# Patient Record
Sex: FEMALE | Race: WHITE | NOT HISPANIC OR LATINO | URBAN - METROPOLITAN AREA
[De-identification: names, ages, dates, MRNs, and addresses within clinical notes are randomized per-mention and may not be internally consistent; named-entity substitution may affect disease eponyms.]

---

## 2018-10-01 ENCOUNTER — NEW PATIENT (OUTPATIENT)
Dept: URBAN - METROPOLITAN AREA CLINIC 51 | Facility: CLINIC | Age: 73
End: 2018-10-01
Payer: MEDICARE

## 2018-10-01 DIAGNOSIS — H25.13 AGE-RELATED NUCLEAR CATARACT, BILATERAL: ICD-10-CM

## 2018-10-01 DIAGNOSIS — H02.834 DERMATOCHALASIS OF LEFT UPPER LID: ICD-10-CM

## 2018-10-01 PROCEDURE — 92004 COMPRE OPH EXAM NEW PT 1/>: CPT | Performed by: OPTOMETRIST

## 2018-10-01 RX ORDER — LIFITEGRAST 50 MG/ML
5 % SOLUTION/ DROPS OPHTHALMIC
Qty: 60 | Refills: 3 | Status: INACTIVE
Start: 2018-10-01 | End: 2018-10-15

## 2018-10-01 RX ORDER — CYCLOSPORINE 0.5 MG/ML
0.05 % EMULSION OPHTHALMIC
Qty: 3 | Refills: 3 | Status: INACTIVE
Start: 2018-10-01 | End: 2018-10-15

## 2018-10-01 ASSESSMENT — VISUAL ACUITY
OD: 20/20
OS: 20/20

## 2018-10-01 ASSESSMENT — INTRAOCULAR PRESSURE
OS: 13
OD: 12

## 2018-10-01 ASSESSMENT — KERATOMETRY
OS: 42.72
OD: 42.51

## 2020-03-03 ENCOUNTER — FOLLOW UP ESTABLISHED (OUTPATIENT)
Dept: URBAN - METROPOLITAN AREA CLINIC 51 | Facility: CLINIC | Age: 75
End: 2020-03-03
Payer: MEDICARE

## 2020-03-03 DIAGNOSIS — H02.831 DERMATOCHALASIS OF RIGHT UPPER EYELID: ICD-10-CM

## 2020-03-03 DIAGNOSIS — H18.59 OTHER HEREDITARY CORNEAL DYSTROPHIES: Primary | ICD-10-CM

## 2020-03-03 PROCEDURE — 92134 CPTRZ OPH DX IMG PST SGM RTA: CPT | Performed by: OPTOMETRIST

## 2020-03-03 PROCEDURE — 92014 COMPRE OPH EXAM EST PT 1/>: CPT | Performed by: OPTOMETRIST

## 2020-03-03 ASSESSMENT — INTRAOCULAR PRESSURE
OD: 15
OS: 15

## 2020-03-03 ASSESSMENT — KERATOMETRY
OS: 42.59
OD: 42.64

## 2020-03-03 ASSESSMENT — VISUAL ACUITY
OS: 20/20
OD: 20/30

## 2021-03-09 ENCOUNTER — FOLLOW UP ESTABLISHED (OUTPATIENT)
Dept: URBAN - METROPOLITAN AREA CLINIC 51 | Facility: CLINIC | Age: 76
End: 2021-03-09
Payer: MEDICARE

## 2021-03-09 DIAGNOSIS — H43.813 VITREOUS DEGENERATION, BILATERAL: ICD-10-CM

## 2021-03-09 DIAGNOSIS — H18.593 OTHER HEREDITARY CORNEAL DYSTROPHIES: ICD-10-CM

## 2021-03-09 DIAGNOSIS — H04.123 DRY EYE SYNDROME OF BILATERAL LACRIMAL GLANDS: ICD-10-CM

## 2021-03-09 PROCEDURE — 92134 CPTRZ OPH DX IMG PST SGM RTA: CPT | Performed by: OPTOMETRIST

## 2021-03-09 PROCEDURE — 92014 COMPRE OPH EXAM EST PT 1/>: CPT | Performed by: OPTOMETRIST

## 2021-03-09 RX ORDER — CYCLOSPORINE 0.5 MG/ML
0.05 % EMULSION OPHTHALMIC
Qty: 120 | Refills: 6 | Status: INACTIVE
Start: 2021-03-09 | End: 2021-04-07

## 2021-03-09 ASSESSMENT — KERATOMETRY
OS: 42.73
OD: 42.50

## 2021-03-09 ASSESSMENT — VISUAL ACUITY
OS: 20/20
OD: 20/30

## 2021-03-09 ASSESSMENT — INTRAOCULAR PRESSURE
OS: 14
OD: 15

## 2022-03-21 ENCOUNTER — OFFICE VISIT (OUTPATIENT)
Dept: URBAN - METROPOLITAN AREA CLINIC 51 | Facility: CLINIC | Age: 77
End: 2022-03-21
Payer: MEDICARE

## 2022-03-21 DIAGNOSIS — H35.371 PUCKERING OF MACULA, RIGHT EYE: ICD-10-CM

## 2022-03-21 DIAGNOSIS — H52.4 PRESBYOPIA: ICD-10-CM

## 2022-03-21 PROCEDURE — 92134 CPTRZ OPH DX IMG PST SGM RTA: CPT | Performed by: OPTOMETRIST

## 2022-03-21 PROCEDURE — 92014 COMPRE OPH EXAM EST PT 1/>: CPT | Performed by: OPTOMETRIST

## 2022-03-21 RX ORDER — CYCLOSPORINE 0.5 MG/ML
0.05 % EMULSION OPHTHALMIC
Qty: 120 | Refills: 6 | Status: INACTIVE
Start: 2022-03-21 | End: 2022-04-19

## 2022-03-21 ASSESSMENT — KERATOMETRY
OS: 42.70
OD: 42.30

## 2022-03-21 ASSESSMENT — INTRAOCULAR PRESSURE
OD: 14
OS: 14

## 2022-03-21 ASSESSMENT — VISUAL ACUITY
OD: 20/20
OS: 20/25

## 2022-03-21 NOTE — IMPRESSION/PLAN
Impression: Other hereditary corneal dystrophies ; OU Plan: Patient to continue ATs QID or more. She is required to do DAILY to minimize risks for fluctuating vision and discomfort or increase risk for corneal abrasions/erosions. Patient to continue Restasis BID OU.  Refilled to [facility]

## 2022-03-21 NOTE — IMPRESSION/PLAN
Impression: Tear film insufficiency of bilateral lacrimal glands *Does use ceiling fans. 
*make up debris OU Plan: see EBMD plan

## 2022-03-21 NOTE — IMPRESSION/PLAN
Impression: Puckering of macula, right eye *Trace Plan: OCT Macular Scan completed, reviewed and documented. Stable, no changes. Check annually or sooner if patient notices distortions or if there is a decline in vision.

## 2023-05-16 ENCOUNTER — OFFICE VISIT (OUTPATIENT)
Dept: URBAN - METROPOLITAN AREA CLINIC 51 | Facility: CLINIC | Age: 78
End: 2023-05-16
Payer: MEDICARE

## 2023-05-16 DIAGNOSIS — H43.813 VITREOUS DEGENERATION, BILATERAL: ICD-10-CM

## 2023-05-16 DIAGNOSIS — H52.4 PRESBYOPIA: ICD-10-CM

## 2023-05-16 DIAGNOSIS — H04.123 TEAR FILM INSUFFICIENCY OF BILATERAL LACRIMAL GLANDS: ICD-10-CM

## 2023-05-16 DIAGNOSIS — H35.3131 NONEXUDATIVE MACULAR DEGENERATION, EARLY DRY STAGE, BILATERAL: ICD-10-CM

## 2023-05-16 DIAGNOSIS — H02.834 DERMATOCHALASIS OF LEFT UPPER EYELID: ICD-10-CM

## 2023-05-16 DIAGNOSIS — H25.13 AGE-RELATED NUCLEAR CATARACT, BILATERAL: Primary | ICD-10-CM

## 2023-05-16 DIAGNOSIS — H02.831 DERMATOCHALASIS OF RIGHT UPPER EYELID: ICD-10-CM

## 2023-05-16 DIAGNOSIS — H18.593 OTHER HEREDITARY CORNEAL DYSTROPHIES: ICD-10-CM

## 2023-05-16 PROCEDURE — 92134 CPTRZ OPH DX IMG PST SGM RTA: CPT | Performed by: OPTOMETRIST

## 2023-05-16 PROCEDURE — 99214 OFFICE O/P EST MOD 30 MIN: CPT | Performed by: OPTOMETRIST

## 2023-05-16 ASSESSMENT — VISUAL ACUITY
OD: 20/30
OS: 20/20

## 2023-05-16 ASSESSMENT — INTRAOCULAR PRESSURE
OS: 15
OD: 13

## 2023-05-16 NOTE — IMPRESSION/PLAN
Impression: Nonexudative macular degeneration, early dry stage, bilateral: H35.3133. Plan: [place]en OU, no SRF OU. Discussed condition with patient. Discussed harmful effects of smoking. Counseling about the benefits and/or risks of the Age-Related Eye Disease Study 2(AREDS) formulation for preventing progression of age-related macular degeneration (AMD) was provided. Recommend UV blocking sunglasses when outdoors and eating green leafy vegetables. OCT performed today and reviewed. OCT check annually. Disp PRN brochure RTC if significant decline with vision or distortions that is new

## 2023-05-16 NOTE — IMPRESSION/PLAN
Impression: Age-related nuclear cataract, bilateral: H25.13. Plan:  Discussed today's findings with patient. The cataracts reduce visual function to a level that interferes with the patient's everyday activities and are progressing. Based on clinical findings. I have discussed with the patient, the option for cataract surgery at this time and possible benefit patient would achieve. At this time patient elects to wait due to 's ongoing medical issues with staph issues. Continue to monitor. Patient will call if any changes/worsening in vision Recheck in 6 months

## 2023-05-16 NOTE — IMPRESSION/PLAN
Impression: Other hereditary corneal dystrophies: H18.593 OU. Plan: Educated patient to continue using ATs QID or more OU. Recommended patient to use ATs daily to minimize fluctuating vision. Patient would like it covered by the [address] through her .

## 2024-05-06 ENCOUNTER — OFFICE VISIT (OUTPATIENT)
Dept: URBAN - METROPOLITAN AREA CLINIC 51 | Facility: CLINIC | Age: 79
End: 2024-05-06
Payer: MEDICARE

## 2024-05-06 DIAGNOSIS — H25.13 AGE-RELATED NUCLEAR CATARACT, BILATERAL: Primary | ICD-10-CM

## 2024-05-06 DIAGNOSIS — H43.813 VITREOUS DEGENERATION, BILATERAL: ICD-10-CM

## 2024-05-06 DIAGNOSIS — H02.834 DERMATOCHALASIS OF LEFT UPPER EYELID: ICD-10-CM

## 2024-05-06 DIAGNOSIS — H18.593 OTHER HEREDITARY CORNEAL DYSTROPHIES: ICD-10-CM

## 2024-05-06 DIAGNOSIS — H52.4 PRESBYOPIA: ICD-10-CM

## 2024-05-06 DIAGNOSIS — H35.3131 NONEXUDATIVE AGE-RELATED MACULAR DEGENERATION, BILATERAL, EARLY DRY STAGE: ICD-10-CM

## 2024-05-06 DIAGNOSIS — H02.831 DERMATOCHALASIS OF RIGHT UPPER EYELID: ICD-10-CM

## 2024-05-06 PROCEDURE — 92134 CPTRZ OPH DX IMG PST SGM RTA: CPT | Performed by: OPTOMETRIST

## 2024-05-06 PROCEDURE — 99214 OFFICE O/P EST MOD 30 MIN: CPT | Performed by: OPTOMETRIST

## 2024-05-06 ASSESSMENT — INTRAOCULAR PRESSURE
OS: 13
OD: 14

## 2024-05-06 ASSESSMENT — KERATOMETRY
OS: 43.00
OD: 43.00

## 2024-05-06 ASSESSMENT — VISUAL ACUITY
OS: 20/25
OD: 20/30

## 2024-11-11 ENCOUNTER — OFFICE VISIT (OUTPATIENT)
Dept: URBAN - METROPOLITAN AREA CLINIC 51 | Facility: CLINIC | Age: 79
End: 2024-11-11
Payer: MEDICARE

## 2024-11-11 DIAGNOSIS — H52.4 PRESBYOPIA: ICD-10-CM

## 2024-11-11 DIAGNOSIS — H04.123 TEAR FILM INSUFFICIENCY OF BILATERAL LACRIMAL GLANDS: ICD-10-CM

## 2024-11-11 DIAGNOSIS — H18.593 OTHER HEREDITARY CORNEAL DYSTROPHIES: ICD-10-CM

## 2024-11-11 DIAGNOSIS — H43.813 VITREOUS DEGENERATION, BILATERAL: ICD-10-CM

## 2024-11-11 DIAGNOSIS — H25.13 AGE-RELATED NUCLEAR CATARACT, BILATERAL: Primary | ICD-10-CM

## 2024-11-11 DIAGNOSIS — H35.3131 NONEXUDATIVE AGE-RELATED MACULAR DEGENERATION, BILATERAL, EARLY DRY STAGE: ICD-10-CM

## 2024-11-11 PROCEDURE — 92134 CPTRZ OPH DX IMG PST SGM RTA: CPT | Performed by: OPTOMETRIST

## 2024-11-11 PROCEDURE — 99214 OFFICE O/P EST MOD 30 MIN: CPT | Performed by: OPTOMETRIST

## 2024-11-11 ASSESSMENT — VISUAL ACUITY
OS: 20/25
OD: 20/40

## 2024-11-11 ASSESSMENT — INTRAOCULAR PRESSURE
OS: 13
OD: 13

## 2024-12-04 ENCOUNTER — ADULT PHYSICAL (OUTPATIENT)
Dept: URBAN - METROPOLITAN AREA CLINIC 51 | Facility: CLINIC | Age: 79
End: 2024-12-04
Payer: MEDICARE

## 2024-12-04 DIAGNOSIS — Z01.818 ENCOUNTER FOR OTHER PREPROCEDURAL EXAMINATION: Primary | ICD-10-CM

## 2024-12-04 DIAGNOSIS — H25.13 AGE-RELATED NUCLEAR CATARACT, BILATERAL: Primary | ICD-10-CM

## 2024-12-04 PROCEDURE — 99203 OFFICE O/P NEW LOW 30 MIN: CPT | Performed by: PHYSICIAN ASSISTANT

## 2024-12-04 RX ORDER — VALSARTAN 160 MG/1
160 MG TABLET, FILM COATED ORAL
Qty: 0 | Refills: 0 | Status: ACTIVE
Start: 2024-12-04

## 2024-12-09 ENCOUNTER — OFFICE VISIT (OUTPATIENT)
Dept: URBAN - METROPOLITAN AREA CLINIC 44 | Facility: CLINIC | Age: 79
End: 2024-12-09
Payer: MEDICARE

## 2024-12-09 DIAGNOSIS — H35.3131 NONEXUDATIVE MACULAR DEGENERATION, EARLY DRY STAGE, BILATERAL: ICD-10-CM

## 2024-12-09 DIAGNOSIS — H04.123 TEAR FILM INSUFFICIENCY OF BILATERAL LACRIMAL GLANDS: ICD-10-CM

## 2024-12-09 DIAGNOSIS — H25.13 AGE-RELATED NUCLEAR CATARACT, BILATERAL: Primary | ICD-10-CM

## 2024-12-09 DIAGNOSIS — H18.593 OTHER HEREDITARY CORNEAL DYSTROPHIES: ICD-10-CM

## 2024-12-09 DIAGNOSIS — H25.12 AGE-RELATED NUCLEAR CATARACT, LEFT EYE: ICD-10-CM

## 2024-12-09 PROCEDURE — 99204 OFFICE O/P NEW MOD 45 MIN: CPT | Performed by: OPHTHALMOLOGY

## 2024-12-09 PROCEDURE — 92025 CPTRIZED CORNEAL TOPOGRAPHY: CPT | Performed by: OPHTHALMOLOGY

## 2024-12-09 PROCEDURE — 92286 ANT SGM IMG I&R SPECLR MIC: CPT | Performed by: OPHTHALMOLOGY

## 2024-12-09 ASSESSMENT — INTRAOCULAR PRESSURE
OD: 13
OS: 13

## 2025-01-14 ENCOUNTER — SURGERY (OUTPATIENT)
Dept: URBAN - METROPOLITAN AREA SURGERY 19 | Facility: SURGERY | Age: 80
End: 2025-01-14
Payer: MEDICARE

## 2025-01-14 PROCEDURE — 66984 XCAPSL CTRC RMVL W/O ECP: CPT | Performed by: OPHTHALMOLOGY

## 2025-01-15 ENCOUNTER — POST-OPERATIVE VISIT (OUTPATIENT)
Dept: URBAN - METROPOLITAN AREA CLINIC 51 | Facility: CLINIC | Age: 80
End: 2025-01-15
Payer: MEDICARE

## 2025-01-15 DIAGNOSIS — Z48.810 ENCOUNTER FOR SURGICAL AFTERCARE FOLLOWING SURGERY ON A SENSE ORGAN: Primary | ICD-10-CM

## 2025-01-15 PROCEDURE — 99024 POSTOP FOLLOW-UP VISIT: CPT | Performed by: OPTOMETRIST

## 2025-01-15 ASSESSMENT — INTRAOCULAR PRESSURE: OD: 17

## 2025-01-21 ENCOUNTER — POST-OPERATIVE VISIT (OUTPATIENT)
Dept: URBAN - METROPOLITAN AREA CLINIC 51 | Facility: CLINIC | Age: 80
End: 2025-01-21
Payer: MEDICARE

## 2025-01-21 DIAGNOSIS — Z48.810 ENCOUNTER FOR SURGICAL AFTERCARE FOLLOWING SURGERY ON A SENSE ORGAN: Primary | ICD-10-CM

## 2025-01-21 PROCEDURE — 99024 POSTOP FOLLOW-UP VISIT: CPT | Performed by: OPTOMETRIST

## 2025-01-21 ASSESSMENT — VISUAL ACUITY
OD: 20/25
OS: 20/30

## 2025-01-21 ASSESSMENT — INTRAOCULAR PRESSURE
OD: 14
OS: 15

## 2025-01-28 ENCOUNTER — SURGERY (OUTPATIENT)
Dept: URBAN - METROPOLITAN AREA SURGERY 19 | Facility: SURGERY | Age: 80
End: 2025-01-28
Payer: MEDICARE

## 2025-01-28 PROCEDURE — 66984 XCAPSL CTRC RMVL W/O ECP: CPT | Performed by: OPHTHALMOLOGY

## 2025-01-29 ENCOUNTER — POST-OPERATIVE VISIT (OUTPATIENT)
Dept: URBAN - METROPOLITAN AREA CLINIC 51 | Facility: CLINIC | Age: 80
End: 2025-01-29
Payer: MEDICARE

## 2025-01-29 DIAGNOSIS — Z96.1 PRESENCE OF INTRAOCULAR LENS: Primary | ICD-10-CM

## 2025-01-29 PROCEDURE — 99024 POSTOP FOLLOW-UP VISIT: CPT | Performed by: OPTOMETRIST

## 2025-02-25 ENCOUNTER — POST-OPERATIVE VISIT (OUTPATIENT)
Dept: URBAN - METROPOLITAN AREA CLINIC 51 | Facility: CLINIC | Age: 80
End: 2025-02-25
Payer: MEDICARE

## 2025-02-25 DIAGNOSIS — H52.4 PRESBYOPIA: ICD-10-CM

## 2025-02-25 DIAGNOSIS — Z96.1 PRESENCE OF INTRAOCULAR LENS: Primary | ICD-10-CM

## 2025-02-25 PROCEDURE — 92015 DETERMINE REFRACTIVE STATE: CPT | Performed by: OPTOMETRIST

## 2025-02-25 PROCEDURE — 99024 POSTOP FOLLOW-UP VISIT: CPT | Performed by: OPTOMETRIST

## 2025-02-25 ASSESSMENT — KERATOMETRY
OS: 42.50
OD: 42.75

## 2025-02-25 ASSESSMENT — VISUAL ACUITY
OS: 20/25
OD: 20/25

## 2025-02-25 ASSESSMENT — INTRAOCULAR PRESSURE
OD: 14
OS: 14